# Patient Record
Sex: MALE | Race: WHITE | NOT HISPANIC OR LATINO | URBAN - METROPOLITAN AREA
[De-identification: names, ages, dates, MRNs, and addresses within clinical notes are randomized per-mention and may not be internally consistent; named-entity substitution may affect disease eponyms.]

---

## 2022-05-10 ENCOUNTER — EMERGENCY (EMERGENCY)
Facility: HOSPITAL | Age: 72
LOS: 1 days | Discharge: HOME | End: 2022-05-10
Attending: EMERGENCY MEDICINE | Admitting: EMERGENCY MEDICINE
Payer: COMMERCIAL

## 2022-05-10 VITALS
SYSTOLIC BLOOD PRESSURE: 130 MMHG | HEIGHT: 67 IN | RESPIRATION RATE: 19 BRPM | WEIGHT: 173.06 LBS | TEMPERATURE: 98 F | DIASTOLIC BLOOD PRESSURE: 72 MMHG | OXYGEN SATURATION: 99 % | HEART RATE: 90 BPM

## 2022-05-10 VITALS
DIASTOLIC BLOOD PRESSURE: 60 MMHG | RESPIRATION RATE: 16 BRPM | OXYGEN SATURATION: 100 % | SYSTOLIC BLOOD PRESSURE: 120 MMHG | HEART RATE: 88 BPM | TEMPERATURE: 97 F

## 2022-05-10 PROCEDURE — 90715 TDAP VACCINE 7 YRS/> IM: CPT

## 2022-05-10 PROCEDURE — 73110 X-RAY EXAM OF WRIST: CPT | Mod: 26,RT

## 2022-05-10 PROCEDURE — 90471 IMMUNIZATION ADMIN: CPT

## 2022-05-10 PROCEDURE — 73110 X-RAY EXAM OF WRIST: CPT

## 2022-05-10 PROCEDURE — 12011 RPR F/E/E/N/L/M 2.5 CM/<: CPT

## 2022-05-10 PROCEDURE — G1004: CPT

## 2022-05-10 PROCEDURE — 99283 EMERGENCY DEPT VISIT LOW MDM: CPT | Mod: 25

## 2022-05-10 PROCEDURE — 99284 EMERGENCY DEPT VISIT MOD MDM: CPT | Mod: 25

## 2022-05-10 PROCEDURE — 70450 CT HEAD/BRAIN W/O DYE: CPT | Mod: ME

## 2022-05-10 PROCEDURE — 70450 CT HEAD/BRAIN W/O DYE: CPT | Mod: 26,ME

## 2022-05-10 PROCEDURE — 72125 CT NECK SPINE W/O DYE: CPT | Mod: MA

## 2022-05-10 PROCEDURE — 72125 CT NECK SPINE W/O DYE: CPT | Mod: 26,MA

## 2022-05-10 RX ORDER — TETANUS TOXOID, REDUCED DIPHTHERIA TOXOID AND ACELLULAR PERTUSSIS VACCINE, ADSORBED 5; 2.5; 8; 8; 2.5 [IU]/.5ML; [IU]/.5ML; UG/.5ML; UG/.5ML; UG/.5ML
0.5 SUSPENSION INTRAMUSCULAR ONCE
Refills: 0 | Status: COMPLETED | OUTPATIENT
Start: 2022-05-10 | End: 2022-05-10

## 2022-05-10 RX ADMIN — TETANUS TOXOID, REDUCED DIPHTHERIA TOXOID AND ACELLULAR PERTUSSIS VACCINE, ADSORBED 0.5 MILLILITER(S): 5; 2.5; 8; 8; 2.5 SUSPENSION INTRAMUSCULAR at 18:58

## 2022-05-10 NOTE — ED PROVIDER NOTE - NS ED ATTENDING STATEMENT MOD
This was a shared visit with the DWIGHT. I reviewed and verified the documentation and independently performed the documented:

## 2022-05-10 NOTE — ED ADULT NURSE NOTE - OBJECTIVE STATEMENT
Patient is a 73yo male fell at work hitting right side of head patient has laceration over right eye . Patient denies pain nausea vomiting diarrhea Patient denies syncope or loss of consciousness. Patient is on daily aspirin 324mg with history of cardiac bypass.

## 2022-05-10 NOTE — ED PROVIDER NOTE - PROGRESS NOTE DETAILS
pt offered plastics, refused. CTs neg. lac repaired with no complication. dc'ed on abx. Discussed the results of all diagnostic testing in ED and copies of all reports given.   Pt was given an opportunity to have all questions answered to satisfaction.  Discussed the importance of prompt, close medical follow-up. ED return precautions discussed at length.  Pt verbalizes agreement and understanding of plan and ED return precautions. Pt well appearing, stable for DC home. No emergent concerns at this time. pt placed in wrist splint

## 2022-05-10 NOTE — ED PROVIDER NOTE - OBJECTIVE STATEMENT
73 yo M PMHx CAD HTN HLD presnts to ED c/o R facial laceration sp mechanical trip and fall this AM. pt denies LOC, states he was able to get up assisted, went to work without difficulty, here for evaluation and repair. pt denies HA, dizziness, neck pain, chest pain, visual changes, N/V, numbness/tingling. +Daily asa use. unknwn last tetanus

## 2022-05-10 NOTE — ED ADULT NURSE NOTE - NSIMPLEMENTINTERV_GEN_ALL_ED
Implemented All Fall Risk Interventions:  [place] to call system. Call bell, personal items and telephone within reach. Instruct patient to call for assistance. Room bathroom lighting operational. Non-slip footwear when patient is off stretcher. Physically safe environment: no spills, clutter or unnecessary equipment. Stretcher in lowest position, wheels locked, appropriate side rails in place. Provide visual cue, wrist band, yellow gown, etc. Monitor gait and stability. Monitor for mental status changes and reorient to person, place, and time. Review medications for side effects contributing to fall risk. Reinforce activity limits and safety measures with patient and family.

## 2022-05-10 NOTE — ED PROVIDER NOTE - ENMT, MLM
+2cm deep lac R forehead, Airway patent, Nasal mucosa clear. Mouth with normal mucosa. Throat has no vesicles, no oropharyngeal exudates and uvula is midline.no hemotympanum

## 2022-05-10 NOTE — ED PROVIDER NOTE - PATIENT PORTAL LINK FT
You can access the FollowMyHealth Patient Portal offered by [facility] by registering at the following website: [email or web address]. By joining [employer]’s FollowMyHealth portal, you will also be able to view your health information using other applications (apps) compatible with our system.

## 2022-05-10 NOTE — ED PROVIDER NOTE - CLINICAL SUMMARY MEDICAL DECISION MAKING FREE TEXT BOX
Pt is a 71 yo mail presnts to ED c/o R facial laceration sp mechanical trip and fall this AM. pt denies LOC, states he was able to get up assisted, went to work without difficulty, here for evaluation and repair. pt denies HA, dizziness, neck pain, chest pain, visual changes, N/V, numbness/tingling. +Daily asa use. unknwn last tetanus.  pt on exam with 2cm deep right forehead laceration just above eyebrow, c spine nt, right wrist minimal pisiform ttp, exam otherwise neg, xrays neg, sutures. dc on augmentin, td, wound care.  d/c sutures one week

## 2022-05-10 NOTE — ED PROVIDER NOTE - NSFOLLOWUPINSTRUCTIONS_ED_ALL_ED_FT
Keep wound clean and dry  Wash with soap and water and keep dry. Apply bacitracin and keep covered.  Take Augmentin twice daily for 10 days  F/u with your PCP for wound check within 2 days  Return to ED immediately for new or worsening symptoms    Sutured Wound Care      Sutures are stitches that can be used to close wounds. Taking care of your wound properly can help to prevent pain and infection. It can also help your wound to heal more quickly. Follow instructions from your health care provider about how to care for your sutured wound.      Supplies needed:    •Soap and water.      •A clean bandage (dressing), if needed.      •Antibiotic ointment.      •A clean towel.        How to care for your sutured wound     •Keep the wound completely dry for the first 24 hours, or for as long as directed by your health care provider. After 24–48 hours, you may shower or bathe as directed by your health care provider. Do not soak or submerge the wound in water until the sutures have been removed.    •After the first 24 hours, clean the wound once a day, or as often as directed by your health care provider, using the following steps:  •Wash the wound with soap and water.      •Rinse the wound with water to remove all soap.      •Pat the wound dry with a clean towel. Do not rub the wound.        •After cleaning the wound, apply a thin layer of antibiotic ointment as directed by your health care provider. This will prevent infection and keep the dressing from sticking to the wound.    •Follow instructions from your health care provider about how to change your dressing:  •Wash your hands with soap and water. If soap and water are not available, use hand .      •Change your dressing at least once a day, or as often as told by your health care provider. If your dressing gets wet or dirty, change it.      •Leave sutures and other skin closures, such as adhesive tape or skin glue, in place. These skin closures may need to stay in place for 2 weeks or longer. If adhesive strip edges start to loosen and curl up, you may trim the loose edges. Do not remove adhesive strips completely unless your health care provider tells you to do that.      •Check your wound every day for signs of infection. Watch for:  •Redness, swelling, or pain.      •Fluid or blood.      •Warmth.      •Pus or a bad smell.        •Have the sutures removed as directed by your health care provider.        Follow these instructions at home:    Medicines     •Take or apply over-the-counter and prescription medicines only as told by your health care provider.      •If you were prescribed an antibiotic medicine or ointment, take or apply it as told by your health care provider. Do not stop using the antibiotic even if your condition improves.      General instructions     •To help reduce scarring after your wound heals, cover your wound with clothing or apply sunscreen of at least 30 SPF whenever you are outside.      • Do not scratch or pick at your wound.      •Avoid stretching your wound.      •Raise (elevate) the injured area above the level of your heart while you are sitting or lying down, if possible.      •Drink enough fluids to keep your urine clear or pale yellow.      •Keep all follow-up visits as told by your health care provider. This is important.        Contact a health care provider if:    •You received a tetanus shot and you have swelling, severe pain, redness, or bleeding at the injection site.      •Your wound breaks open.      •You have redness, swelling, or pain around your wound.      •You have fluid or blood coming from your wound.      •Your wound feels warm to the touch.      •You have a fever.      •You notice something coming out of your wound, such as wood or glass.      •You have pain that does not get better with medicine.      •The skin near your wound changes color.      •You need to change your dressing very frequently due to a lot of fluid, blood, or pus draining from the wound.      •You develop a new rash.      •You develop numbness around the wound.        Get help right away if:    •You develop severe swelling around your wound.      •You have pus or a bad smell coming from your wound.      •Your pain suddenly gets worse and is severe.      •You develop painful lumps near your wound or anywhere on your body.      •You have a red streak going away from your wound.    •The wound is on your hand or foot and:  •You cannot properly move a finger or toe.      •Your fingers or toes look pale or bluish.      •You have numbness that is spreading down your hand, foot, fingers, or toes.          Summary    •Sutures are stitches that can be used to close wounds.      •Taking care of your wound properly can help to prevent pain and infection.      •Keep the wound completely dry for the first 24 hours, or for as long as directed by your health care provider. After 24–48 hours, you may shower or bathe as directed by your health care provider.      This information is not intended to replace advice given to you by your health care provider. Make sure you discuss any questions you have with your health care provider.      Document Revised: 10/14/2021 Document Reviewed: 10/15/2021    [employer] Patient Education © 2022 [employer]                  Log Out.      [employer]® CareNotes®     :  [facility]  	                     HEAD INJURY - AfterCare(R) Instructions(ER/ED)           Head Injury    WHAT YOU NEED TO KNOW:    A head injury can include your scalp, face, skull, or brain and range from mild to severe. Effects can appear immediately after the injury or develop later. The effects may last a short time or be permanent. Healthcare providers may want to check your recovery over time. Treatment may change as you recover or develop new health problems from the head injury.    DISCHARGE INSTRUCTIONS:    Call your local emergency number (911 in the ), or have someone else call if:   •You cannot be woken.      •You have a seizure.      •You stop responding to others or you faint.      •You have blurry or double vision.      •Your speech becomes slurred or confused.      •You have arm or leg weakness, loss of feeling, or new problems with coordination.      •Your pupils are larger than usual, or one pupil is a different size than the other.      •You have blood or clear fluid coming out of your ears or nose.      Return to the emergency department if:   •You have repeated or forceful vomiting.      •You feel confused.      •Your headache gets worse or becomes severe.      •You or someone caring for you notices that you are harder to wake than usual.      Call your doctor if:   •Your symptoms last longer than 6 weeks after the injury.      •You have questions or concerns about your condition or care.      Medicines:   •Acetaminophen decreases pain and fever. It is available without a doctor's order. Ask how much to take and how often to take it. Follow directions. Read the labels of all other medicines you are using to see if they also contain acetaminophen, or ask your doctor or pharmacist. Acetaminophen can cause liver damage if not taken correctly.       •Take your medicine as directed. Contact your healthcare provider if you think your medicine is not helping or if you have side effects. Tell him or her if you are allergic to any medicine. Keep a list of the medicines, vitamins, and herbs you take. Include the amounts, and when and why you take them. Bring the list or the pill bottles to follow-up visits. Carry your medicine list with you in case of an emergency.      Self-care:   •Rest or do quiet activities. Limit your time watching TV, using the computer, or doing tasks that require a lot of thinking. Slowly return to your normal activities as directed. Do not play sports or do activities that may cause you to get hit in the head. Ask your healthcare provider when you can return to sports.      •Apply ice on your head for 15 to 20 minutes every hour or as directed. Use an ice pack, or put crushed ice in a plastic bag. Cover it with a towel before you apply it to your skin. Ice helps prevent tissue damage and decreases swelling and pain.      •Have someone stay with you for 24 hours , or as directed. This person can monitor you for problems and call for help if needed. When you are awake, the person should ask you a few questions every few hours to see if you are thinking clearly. An example is to ask your name or address.      Prevent another head injury:   •Wear a helmet that fits properly. Do this when you play sports, or ride a bike, scooter, or skateboard. Helmets help decrease your risk for a serious head injury. Talk to your healthcare provider about other ways you can protect yourself if you play sports.      •Wear your seatbelt every time you are in a car. This helps lower your risk for a head injury if you are in a car accident.      Follow up with your doctor as directed: Write down your questions so you remember to ask them during your visits.       © Copyright [employer] 2022           back to top                          © Copyright [employer] 2022

## 2022-05-20 ENCOUNTER — EMERGENCY (EMERGENCY)
Facility: HOSPITAL | Age: 72
LOS: 1 days | Discharge: HOME | End: 2022-05-20
Attending: EMERGENCY MEDICINE | Admitting: EMERGENCY MEDICINE
Payer: COMMERCIAL

## 2022-05-20 VITALS
SYSTOLIC BLOOD PRESSURE: 115 MMHG | TEMPERATURE: 98 F | HEART RATE: 65 BPM | HEIGHT: 67 IN | WEIGHT: 167.99 LBS | DIASTOLIC BLOOD PRESSURE: 66 MMHG | OXYGEN SATURATION: 98 % | RESPIRATION RATE: 16 BRPM

## 2022-05-20 PROBLEM — I10 ESSENTIAL (PRIMARY) HYPERTENSION: Chronic | Status: ACTIVE | Noted: 2022-05-10

## 2022-05-20 PROBLEM — I25.10 ATHEROSCLEROTIC HEART DISEASE OF NATIVE CORONARY ARTERY WITHOUT ANGINA PECTORIS: Chronic | Status: ACTIVE | Noted: 2022-05-10

## 2022-05-20 PROCEDURE — L9995: CPT

## 2022-05-20 PROCEDURE — G0463: CPT

## 2022-05-20 NOTE — ED PROVIDER NOTE - PATIENT PORTAL LINK FT
You can access the FollowMyHealth Patient Portal offered by [facility] by registering at the following website: [email or web address]. By joining [facility]’s FollowMyHealth portal, you will also be able to view your health information using other applications (apps) compatible with our system.

## 2022-05-20 NOTE — ED PROVIDER NOTE - NSFOLLOWUPINSTRUCTIONS_ED_ALL_ED_FT
Follow up with pcp  return to er for any worsening symptoms     Suture Removal, Care After      This sheet gives you information about how to care for yourself after your procedure. Your health care provider may also give you more specific instructions. If you have problems or questions, contact your health care provider.      What can I expect after the procedure?    After your stitches (sutures) are removed, it is common to have:  •Some discomfort and swelling in the area.      •Slight redness in the area.        Follow these instructions at home:    If you have a bandage:     •Wash your hands with soap and water before you change your bandage (dressing). If soap and water are not available, use hand .      •Change your dressing as told by your health care provider. If your dressing becomes wet or dirty, or develops a bad smell, change it as soon as possible.      •If your dressing sticks to your skin, soak it in warm water to loosen it.        Wound care    •Check your wound every day for signs of infection. Check for:  •More redness, swelling, or pain.      •Fluid or blood.      •Warmth.      • Pus or a bad smell.        •Wash your hands with soap and water before and after touching your wound.      •Apply cream or ointment only as directed by your health care provider. If you are using cream or ointment, wash the area with soap and water 2 times a day to remove all the cream or ointment. Rinse off the soap and pat the area dry with a clean towel.      •If you have skin glue or adhesive strips on your wound, leave these closures in place. They may need to stay in place for 2 weeks or longer. If adhesive strip edges start to loosen and curl up, you may trim the loose edges. Do not remove adhesive strips completely unless your health care provider tells you to do that.      •Keep the wound area dry and clean. Do not take baths, swim, or use a hot tub until your health care provider approves.      •Continue to protect the wound from injury.      • Do not pick at your wound. Picking can cause an infection.      •When your wound has completely healed, wear sunscreen over it or cover it with clothing when you are outside. New scars get sunburned easily, which can make scarring worse.      General instructions     •Take over-the-counter and prescription medicines only as told by your health care provider.      •Keep all follow-up visits as told by your health care provider. This is important.        Contact a health care provider if:    •You have redness, swelling, or pain around your wound.      •You have fluid or blood coming from your wound.      •Your wound feels warm to the touch.      •You have pus or a bad smell coming from your wound.      •Your wound opens up.        Get help right away if:    •You have a fever.      •You have redness that is spreading from your wound.        Summary    •After your sutures are removed, it is common to have some discomfort and swelling in the area.      •Wash your hands with soap and water before you change your bandage (dressing).      •Keep the wound area dry and clean. Do not take baths, swim, or use a hot tub until your health care provider approves.      This information is not intended to replace advice given to you by your health care provider. Make sure you discuss any questions you have with your health care provider.      Document Revised: 10/14/2021 Document Reviewed: 10/15/2021    [employer] Patient Education © 2022 [employer] Follow up with pcp and plastics   return to er for any worsening symptoms     Suture Removal, Care After      This sheet gives you information about how to care for yourself after your procedure. Your health care provider may also give you more specific instructions. If you have problems or questions, contact your health care provider.      What can I expect after the procedure?    After your stitches (sutures) are removed, it is common to have:  •Some discomfort and swelling in the area.      •Slight redness in the area.        Follow these instructions at home:    If you have a bandage:     •Wash your hands with soap and water before you change your bandage (dressing). If soap and water are not available, use hand .      •Change your dressing as told by your health care provider. If your dressing becomes wet or dirty, or develops a bad smell, change it as soon as possible.      •If your dressing sticks to your skin, soak it in warm water to loosen it.        Wound care    •Check your wound every day for signs of infection. Check for:  •More redness, swelling, or pain.      •Fluid or blood.      •Warmth.      • Pus or a bad smell.        •Wash your hands with soap and water before and after touching your wound.      •Apply cream or ointment only as directed by your health care provider. If you are using cream or ointment, wash the area with soap and water 2 times a day to remove all the cream or ointment. Rinse off the soap and pat the area dry with a clean towel.      •If you have skin glue or adhesive strips on your wound, leave these closures in place. They may need to stay in place for 2 weeks or longer. If adhesive strip edges start to loosen and curl up, you may trim the loose edges. Do not remove adhesive strips completely unless your health care provider tells you to do that.      •Keep the wound area dry and clean. Do not take baths, swim, or use a hot tub until your health care provider approves.      •Continue to protect the wound from injury.      • Do not pick at your wound. Picking can cause an infection.      •When your wound has completely healed, wear sunscreen over it or cover it with clothing when you are outside. New scars get sunburned easily, which can make scarring worse.      General instructions     •Take over-the-counter and prescription medicines only as told by your health care provider.      •Keep all follow-up visits as told by your health care provider. This is important.        Contact a health care provider if:    •You have redness, swelling, or pain around your wound.      •You have fluid or blood coming from your wound.      •Your wound feels warm to the touch.      •You have pus or a bad smell coming from your wound.      •Your wound opens up.        Get help right away if:    •You have a fever.      •You have redness that is spreading from your wound.        Summary    •After your sutures are removed, it is common to have some discomfort and swelling in the area.      •Wash your hands with soap and water before you change your bandage (dressing).      •Keep the wound area dry and clean. Do not take baths, swim, or use a hot tub until your health care provider approves.      This information is not intended to replace advice given to you by your health care provider. Make sure you discuss any questions you have with your health care provider.      Document Revised: 10/14/2021 Document Reviewed: 10/15/2021    [employer] Patient Education © 2022 [employer] Inc.

## 2022-05-20 NOTE — ED PROVIDER NOTE - OBJECTIVE STATEMENT
Pt is a 71 yo male with pmhx of  CAD HTN HLD presnts to ED c/o laceration removal R facial laceration sp mechanical trip and fall 9 days ago. pt denies any complaints no fever no drainage Pt is a 71 yo male with pmhx of  CAD HTN HLD presnts to ED c/o laceration removal R facial laceration sp mechanical trip and fall 9 days ago. pt denies any complaints no fever no drainage. pt co of mild numbness to right scalp area no blurry vision

## 2022-05-20 NOTE — ED PROVIDER NOTE - CARE PROVIDER_API CALL
Hany Cruz (DO)  Surgery  [address], [address]  [place] [address]  Phone: [phone]  Fax: [phone]  Follow Up Time:

## 2022-05-20 NOTE — ED PROVIDER NOTE - CONSTITUTIONAL, MLM
normal... Well appearing, awake, alert, oriented to person, place, time/situation and in no apparent distress. right forehead cdi suture in place Well appearing, awake, alert, oriented to person, place, time/situation and in no apparent distress. right forehead steri strip in place wound mildly open no redness no discharge right eyebrow droop noted

## 2022-05-20 NOTE — ED PROVIDER NOTE - NS ED ATTENDING STATEMENT MOD
[New Patient/Consultation] : a new patient/consultation for [Thrombocytopenia] : thrombocytopenia [Parents] : parents [Medical Records] : medical records This was a shared visit with the DWIGHT. I reviewed and verified the documentation and independently performed the documented:

## 2022-12-06 ENCOUNTER — EMERGENCY (EMERGENCY)
Facility: HOSPITAL | Age: 72
LOS: 1 days | Discharge: HOME | End: 2022-12-06
Attending: EMERGENCY MEDICINE | Admitting: EMERGENCY MEDICINE
Payer: COMMERCIAL

## 2022-12-06 VITALS
DIASTOLIC BLOOD PRESSURE: 95 MMHG | HEART RATE: 52 BPM | SYSTOLIC BLOOD PRESSURE: 175 MMHG | WEIGHT: 175.05 LBS | RESPIRATION RATE: 18 BRPM | TEMPERATURE: 97 F | OXYGEN SATURATION: 100 %

## 2022-12-06 VITALS
SYSTOLIC BLOOD PRESSURE: 150 MMHG | RESPIRATION RATE: 18 BRPM | OXYGEN SATURATION: 99 % | DIASTOLIC BLOOD PRESSURE: 60 MMHG | TEMPERATURE: 98 F | HEART RATE: 54 BPM

## 2022-12-06 PROBLEM — Z00.00 ENCOUNTER FOR PREVENTIVE HEALTH EXAMINATION: Status: ACTIVE | Noted: 2022-12-06

## 2022-12-06 PROCEDURE — 73080 X-RAY EXAM OF ELBOW: CPT

## 2022-12-06 PROCEDURE — 99283 EMERGENCY DEPT VISIT LOW MDM: CPT | Mod: 25

## 2022-12-06 PROCEDURE — 99283 EMERGENCY DEPT VISIT LOW MDM: CPT

## 2022-12-06 PROCEDURE — 73080 X-RAY EXAM OF ELBOW: CPT | Mod: 26,RT
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
## 2022-12-06 NOTE — ED PROVIDER NOTE - NSFOLLOWUPINSTRUCTIONS_ED_ALL_ED_FT
[employer]® CareNotes®     :  [facility]  	        ELBOW SPRAIN - AfterCare(R) Instructions(ER/ED)       Elbow Sprain    WHAT YOU NEED TO KNOW:    An elbow sprain is caused by a stretched or torn ligament in the elbow joint. Ligaments are the strong tissues that connect bones.    DISCHARGE INSTRUCTIONS:    Return to the emergency department if:   •The skin of your injured arm looks bluish or pale (less color than normal).      •You have new or increased numbness in your injured arm.      Call your doctor if:   •You have increased swelling and pain in your elbow.      •You have new or increased stiffness or trouble moving your injured arm.      •You have questions or concerns about your condition or care.      Medicines:   •Prescription pain medicine may be given. Ask your healthcare provider how to take this medicine safely. Some prescription pain medicines contain acetaminophen. Do not take other medicines that contain acetaminophen without talking to your healthcare provider. Too much acetaminophen may cause liver damage. Prescription pain medicine may cause constipation. Ask your healthcare provider how to prevent or treat constipation.       •Take your medicine as directed. Contact your healthcare provider if you think your medicine is not helping or if you have side effects. Tell your provider if you are allergic to any medicine. Keep a list of the medicines, vitamins, and herbs you take. Include the amounts, and when and why you take them. Bring the list or the pill bottles to follow-up visits. Carry your medicine list with you in case of an emergency.      Rest your elbow: You will need to rest your elbow for 1 to 2 days after your injury. This will help decrease the risk of more damage to your elbow.    Ice your elbow: Apply ice on your elbow for 15 to 20 minutes every hour or as directed. Use an ice pack, or put crushed ice in a plastic bag. Cover it with a towel. Ice helps prevent tissue damage and decreases swelling and pain.    Compress your elbow: Compression provides support and helps decrease swelling and movement so your elbow can heal. You may be told to keep your elbow wrapped with a tight elastic bandage. Follow instructions about how to apply your bandage.    Elevate your elbow: Elevate your elbow above the level of your heart as often as you can. This will help decrease swelling and pain. Prop your elbow on pillows or blankets to keep it elevated comfortably.     Exercise your elbow: You should begin to exercise your arm in a few days, once you are able to move your elbow without pain. Exercises will help decrease stiffness and improve the strength of your arm. Ask your healthcare provider what kind of exercises you should do.    Prevent another elbow sprain:   •Make sure you warm up and stretch before you exercise.      •Do not exercise when you feel pain or you are tired.      •Wear equipment to protect yourself when you play sports.      •Stop exercising and playing sports if your symptoms from a past injury return.      Follow up with your doctor within 1 week: Write down any questions you have so you remember to ask them in your follow-up visits.       © Copyright [employer] 2022         Keep sling on.  Cold Packs 10 minutes on/ 10 minutes off  Advil 2-3 tablets (400-600mg) every 8 hours with food as needed.  Follow up with orthopedist this week.  Please return to the Emergency Department immediately for any problems or concerns.

## 2022-12-06 NOTE — ED ADULT NURSE NOTE - NSIMPLEMENTINTERV_GEN_ALL_ED
Implemented All Universal Safety Interventions:  [place] to call system. Call bell, personal items and telephone within reach. Instruct patient to call for assistance. Room bathroom lighting operational. Non-slip footwear when patient is off stretcher. Physically safe environment: no spills, clutter or unnecessary equipment. Stretcher in lowest position, wheels locked, appropriate side rails in place.

## 2022-12-06 NOTE — ED PROVIDER NOTE - PHYSICAL EXAMINATION
Gen: Awake, Alert, WD, WN, NAD  Head:  NC/AT  Neck: supple, nontender, trachea midline  Ext:  warm, well perfused, moving all extremities spontaneously, right shoulde/arm/forearm/wrist/hand/elbow non tender, no edema, FROM, neg supraspinatus stress test, neg supination stress test. no elbow deformity or point tenderness.  Skin: intact, no rash, no vesicles, no petechiae, no ecchymosis  Neuro:  AAOx3, sensation intact, motor 5/5 x 4 extremities, normal gait, speech clear

## 2022-12-06 NOTE — ED ADULT TRIAGE NOTE - CCCP TRG CHIEF CMPLNT
pain, arm O-T Plasty Text: The defect edges were debeveled with a #15 scalpel blade.  Given the location of the defect, shape of the defect and the proximity to free margins an O-T plasty was deemed most appropriate.  Using a sterile surgical marker, an appropriate O-T plasty was drawn incorporating the defect and placing the expected incisions within the relaxed skin tension lines where possible.    The area thus outlined was incised deep to adipose tissue with a #15 scalpel blade.  The skin margins were undermined to an appropriate distance in all directions utilizing iris scissors.

## 2022-12-06 NOTE — ED PROVIDER NOTE - OBJECTIVE STATEMENT
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 pt denies swelling, redness, numbness, tingling, weakness, shoulder pain, neck pain, wrist or hand pain, forearm pain.  RHD  no orthopedist

## 2022-12-06 NOTE — ED ADULT NURSE NOTE - OBJECTIVE STATEMENT
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 Patient states that yesterday he had to physically move the arm on to his chest why he slept. Patient had no ROM issue today but feel pain in arm

## 2022-12-06 NOTE — ED PROVIDER NOTE - PATIENT PORTAL LINK FT
You can access the FollowMyHealth Patient Portal offered by [facility] by registering at the following website: [email or web address]. By joining [facility]’s FollowMyHealth portal, you will also be able to view your health information using other applications (apps) compatible with our system.

## 2022-12-06 NOTE — ED PROVIDER NOTE - CARE PROVIDER_API CALL
John Zavala)  Sanjay Ortiz  Physicians  [address] [place] [address]  Phone: [phone]  Fax: [phone]  Follow Up Time: 4-6 Days  
36.1

## 2022-12-08 ENCOUNTER — APPOINTMENT (OUTPATIENT)
Dept: ORTHOPEDIC SURGERY | Facility: CLINIC | Age: 72
End: 2022-12-08

## 2022-12-08 VITALS — HEIGHT: 67 IN | BODY MASS INDEX: 27.47 KG/M2 | WEIGHT: 175 LBS

## 2022-12-08 DIAGNOSIS — I10 ESSENTIAL (PRIMARY) HYPERTENSION: ICD-10-CM

## 2022-12-08 DIAGNOSIS — E78.00 PURE HYPERCHOLESTEROLEMIA, UNSPECIFIED: ICD-10-CM

## 2022-12-08 DIAGNOSIS — S46.211A STRAIN OF MUSCLE, FASCIA AND TENDON OF OTHER PARTS OF BICEPS, RIGHT ARM, INITIAL ENCOUNTER: ICD-10-CM

## 2022-12-08 DIAGNOSIS — Z78.9 OTHER SPECIFIED HEALTH STATUS: ICD-10-CM

## 2022-12-08 DIAGNOSIS — E11.9 TYPE 2 DIABETES MELLITUS W/OUT COMPLICATIONS: ICD-10-CM

## 2022-12-08 PROCEDURE — 99072 ADDL SUPL MATRL&STAF TM PHE: CPT

## 2022-12-08 PROCEDURE — 99203 OFFICE O/P NEW LOW 30 MIN: CPT

## 2022-12-08 RX ORDER — ALFUZOSIN HYDROCHLORIDE 10 MG/1
TABLET, EXTENDED RELEASE ORAL
Refills: 0 | Status: ACTIVE | COMMUNITY

## 2022-12-08 NOTE — IMAGING
[de-identified] : No swelling, no ecchymosis, no warmth, no fluctuance.\par No tenderness to palpation over elbow\par Full elbow flexion, extension, supination, pronation\par 5/5 strength in flexion, extension, supination, pronation\par No Varus or Valgus instability\par Motor and sensory intact distally\par

## 2022-12-08 NOTE — HISTORY OF PRESENT ILLNESS
[6] : 6 [5] : 5 [Dull/Aching] : dull/aching [Localized] : localized [Full time] : Work status: full time [] : This patient has had an injection before: no [FreeTextEntry1] : R Elbow [FreeTextEntry3] : 12/5/22 [FreeTextEntry5] : 71 Y/O RHD M Eval R elbow S/P Pressure injury at work on above DOI prior TX of XR on 12/7/22 by [facility]  [de-identified] : 12/7/22 [de-identified] : [facility] [de-identified] : XR  [de-identified] : XR  [de-identified] : Rick

## 2022-12-08 NOTE — ASSESSMENT
[FreeTextEntry1] : RIGHT BICEP STRAIN AT WORK\par NORMAL XRAYS\par SYMPTOMS RESOLVED; EXAM NORMAL TODAY\par F/U PRN

## 2023-05-01 ENCOUNTER — OFFICE (OUTPATIENT)
Dept: URBAN - METROPOLITAN AREA CLINIC 109 | Facility: CLINIC | Age: 73
Setting detail: OPHTHALMOLOGY
End: 2023-05-01
Payer: COMMERCIAL

## 2023-05-01 VITALS — HEIGHT: 60 IN

## 2023-05-01 DIAGNOSIS — E11.3293: ICD-10-CM

## 2023-05-01 DIAGNOSIS — H25.13: ICD-10-CM

## 2023-05-01 PROCEDURE — 99213 OFFICE O/P EST LOW 20 MIN: CPT | Performed by: OPHTHALMOLOGY

## 2023-05-01 PROCEDURE — 92201 OPSCPY EXTND RTA DRAW UNI/BI: CPT | Performed by: OPHTHALMOLOGY

## 2023-05-01 ASSESSMENT — REFRACTION_AUTOREFRACTION
OD_SPHERE: +1.00
OS_CYLINDER: -1.00
OS_AXIS: 095
OD_AXIS: 130
OS_SPHERE: +0.75
OD_CYLINDER: -0.50

## 2023-05-01 ASSESSMENT — KERATOMETRY
OD_K2POWER_DIOPTERS: 44.87
OD_AXISANGLE_DEGREES: 113
OD_K1POWER_DIOPTERS: 44.37
OS_AXISANGLE_DEGREES: 87
OS_K2POWER_DIOPTERS: 44.75
OS_K1POWER_DIOPTERS: 44.25

## 2023-05-01 ASSESSMENT — AXIALLENGTH_DERIVED
OD_AL: 22.9087
OS_AL: 23.1362

## 2023-05-01 ASSESSMENT — SPHEQUIV_DERIVED
OS_SPHEQUIV: 0.25
OD_SPHEQUIV: 0.75

## 2023-05-01 ASSESSMENT — REFRACTION_CURRENTRX
OS_OVR_VA: 20/
OD_OVR_VA: 20/
OS_SPHERE: +2.00
OD_SPHERE: +2.00

## 2023-05-01 ASSESSMENT — VISUAL ACUITY
OD_BCVA: 20/25-1
OS_BCVA: 20/40-2

## 2023-05-01 ASSESSMENT — CONFRONTATIONAL VISUAL FIELD TEST (CVF)
OD_FINDINGS: FULL
OS_FINDINGS: FULL

## 2023-05-01 ASSESSMENT — REFRACTION_MANIFEST
OD_SPHERE: +0.75
OS_SPHERE: +0.50

## 2024-05-06 ENCOUNTER — OFFICE (OUTPATIENT)
Dept: URBAN - METROPOLITAN AREA CLINIC 109 | Facility: CLINIC | Age: 74
Setting detail: OPHTHALMOLOGY
End: 2024-05-06
Payer: COMMERCIAL

## 2024-05-06 DIAGNOSIS — E11.3293: ICD-10-CM

## 2024-05-06 DIAGNOSIS — H25.13: ICD-10-CM

## 2024-05-06 DIAGNOSIS — H43.393: ICD-10-CM

## 2024-05-06 PROCEDURE — 92250 FUNDUS PHOTOGRAPHY W/I&R: CPT | Performed by: OPHTHALMOLOGY

## 2024-05-06 PROCEDURE — 92014 COMPRE OPH EXAM EST PT 1/>: CPT | Performed by: OPHTHALMOLOGY

## 2024-05-06 ASSESSMENT — CONFRONTATIONAL VISUAL FIELD TEST (CVF)
OD_FINDINGS: FULL
OS_FINDINGS: FULL

## 2025-05-07 ENCOUNTER — OFFICE (OUTPATIENT)
Dept: URBAN - METROPOLITAN AREA CLINIC 109 | Facility: CLINIC | Age: 75
Setting detail: OPHTHALMOLOGY
End: 2025-05-07
Payer: COMMERCIAL

## 2025-05-07 VITALS — HEIGHT: 60 IN

## 2025-05-07 DIAGNOSIS — H43.393: ICD-10-CM

## 2025-05-07 DIAGNOSIS — H25.11: ICD-10-CM

## 2025-05-07 DIAGNOSIS — E11.3293: ICD-10-CM

## 2025-05-07 PROCEDURE — 99214 OFFICE O/P EST MOD 30 MIN: CPT | Performed by: OPHTHALMOLOGY

## 2025-05-07 ASSESSMENT — REFRACTION_AUTOREFRACTION
OD_SPHERE: -2.50
OS_AXIS: 110
OS_SPHERE: +0.50
OD_AXIS: 50
OS_CYLINDER: -0.75
OD_CYLINDER: -0.50

## 2025-05-07 ASSESSMENT — REFRACTION_CURRENTRX
OS_OVR_VA: 20/
OD_OVR_VA: 20/
OD_SPHERE: +2.00
OS_SPHERE: +2.00

## 2025-05-07 ASSESSMENT — KERATOMETRY
OD_K1POWER_DIOPTERS: 44.50
OS_AXISANGLE_DEGREES: 178
OD_AXISANGLE_DEGREES: 112
OS_K2POWER_DIOPTERS: 45.25
OD_K2POWER_DIOPTERS: 45.00
OS_K1POWER_DIOPTERS: 44.75

## 2025-05-07 ASSESSMENT — REFRACTION_MANIFEST
OD_VA1: 20/NI
OD_SPHERE: +0.75
OS_SPHERE: +0.50

## 2025-05-07 ASSESSMENT — VISUAL ACUITY
OD_BCVA: 20/20
OS_BCVA: 20/60-2

## 2025-05-07 ASSESSMENT — CONFRONTATIONAL VISUAL FIELD TEST (CVF)
OS_FINDINGS: FULL
OD_FINDINGS: FULL

## 2025-06-02 ENCOUNTER — OFFICE (OUTPATIENT)
Dept: URBAN - METROPOLITAN AREA CLINIC 109 | Facility: CLINIC | Age: 75
Setting detail: OPHTHALMOLOGY
End: 2025-06-02
Payer: COMMERCIAL

## 2025-06-02 DIAGNOSIS — H25.13: ICD-10-CM

## 2025-06-02 DIAGNOSIS — H25.11: ICD-10-CM

## 2025-06-02 DIAGNOSIS — E11.3293: ICD-10-CM

## 2025-06-02 PROCEDURE — 92134 CPTRZ OPH DX IMG PST SGM RTA: CPT | Performed by: OPHTHALMOLOGY

## 2025-06-02 PROCEDURE — 99213 OFFICE O/P EST LOW 20 MIN: CPT | Performed by: OPHTHALMOLOGY

## 2025-06-02 PROCEDURE — 92136 OPHTHALMIC BIOMETRY: CPT | Performed by: OPHTHALMOLOGY

## 2025-06-02 ASSESSMENT — REFRACTION_AUTOREFRACTION
OD_CYLINDER: -0.50
OD_SPHERE: -2.75
OD_AXIS: 031
OS_SPHERE: +0.50
OS_AXIS: 110
OS_CYLINDER: -0.75

## 2025-06-02 ASSESSMENT — KERATOMETRY
OS_CYLAXISANGLE_DEGREES: 155
OS_K1K2_AVERAGE: 44.875
OD_AXISANGLE2_DEGREES: 71
OS_CYLPOWER_DEGREES: 0.25
OS_AXISANGLE_DEGREES: 155
OD_K1POWER_DIOPTERS: 44.50
OS_K2POWER_DIOPTERS: 45.00
OD_K2POWER_DIOPTERS: 45.50
OS_K1POWER_DIOPTERS: 44.75
OD_CYLPOWER_DEGREES: 1
OD_AXISANGLE_DEGREES: 71
OS_K1POWER_DIOPTERS: 44.75
OD_K2POWER_DIOPTERS: 45.50
OS_AXISANGLE_DEGREES: 155
OD_K1POWER_DIOPTERS: 44.50
OD_CYLAXISANGLE_DEGREES: 71
OD_AXISANGLE_DEGREES: 71
OD_K1K2_AVERAGE: 45
OS_K2POWER_DIOPTERS: 45.00
OS_AXISANGLE2_DEGREES: 155

## 2025-06-02 ASSESSMENT — REFRACTION_CURRENTRX
OD_SPHERE: +2.00
OS_OVR_VA: 20/
OD_OVR_VA: 20/
OS_SPHERE: +2.00

## 2025-06-02 ASSESSMENT — VISUAL ACUITY
OS_BCVA: 20/60-2
OD_BCVA: 20/20

## 2025-06-02 ASSESSMENT — CONFRONTATIONAL VISUAL FIELD TEST (CVF)
OD_FINDINGS: FULL
OS_FINDINGS: FULL

## 2025-06-02 ASSESSMENT — REFRACTION_MANIFEST
OD_SPHERE: +0.75
OS_SPHERE: +0.50
OD_VA1: 20/NI

## 2025-06-24 ENCOUNTER — AMBULATORY SURGERY CENTER (OUTPATIENT)
Dept: URBAN - METROPOLITAN AREA SURGERY 19 | Facility: SURGERY | Age: 75
Setting detail: OPHTHALMOLOGY
End: 2025-06-24
Payer: COMMERCIAL

## 2025-06-24 DIAGNOSIS — H25.11: ICD-10-CM

## 2025-06-24 DIAGNOSIS — H52.221: ICD-10-CM

## 2025-06-24 PROCEDURE — FEMTO PRECISION LASER CATARACT SURGERY: Mod: GY | Performed by: OPHTHALMOLOGY

## 2025-06-24 PROCEDURE — 66984 XCAPSL CTRC RMVL W/O ECP: CPT | Mod: RT | Performed by: OPHTHALMOLOGY

## 2025-06-25 ENCOUNTER — RX ONLY (RX ONLY)
Age: 75
End: 2025-06-25

## 2025-06-25 ENCOUNTER — OFFICE (OUTPATIENT)
Dept: URBAN - METROPOLITAN AREA CLINIC 109 | Facility: CLINIC | Age: 75
Setting detail: OPHTHALMOLOGY
End: 2025-06-25
Payer: COMMERCIAL

## 2025-06-25 DIAGNOSIS — Z96.1: ICD-10-CM

## 2025-06-25 PROBLEM — H25.12 CATARACT SENILE NUCLEAR SCLEROSIS;  ,  , LEFT EYE: Status: ACTIVE | Noted: 2025-06-25

## 2025-06-25 PROCEDURE — 99024 POSTOP FOLLOW-UP VISIT: CPT | Performed by: OPTOMETRIST

## 2025-06-25 ASSESSMENT — REFRACTION_MANIFEST
OD_VA1: 20/NI
OD_SPHERE: +0.75
OS_SPHERE: +0.50

## 2025-06-25 ASSESSMENT — REFRACTION_CURRENTRX
OD_SPHERE: +2.00
OS_SPHERE: +2.00
OS_OVR_VA: 20/
OD_OVR_VA: 20/

## 2025-06-25 ASSESSMENT — REFRACTION_AUTOREFRACTION
OD_SPHERE: -0.25
OD_AXIS: 5
OD_CYLINDER: +0.50
OS_SPHERE: +0.25
OS_CYLINDER: -0.75
OS_AXIS: 101

## 2025-06-25 ASSESSMENT — KERATOMETRY
OS_AXISANGLE_DEGREES: 155
OD_K2POWER_DIOPTERS: 45.50
OD_AXISANGLE_DEGREES: 71
OS_K2POWER_DIOPTERS: 45.00
OD_K1POWER_DIOPTERS: 44.50
OS_K1POWER_DIOPTERS: 44.75

## 2025-06-25 ASSESSMENT — TONOMETRY: OS_IOP_MMHG: 12

## 2025-06-25 ASSESSMENT — CONFRONTATIONAL VISUAL FIELD TEST (CVF)
OD_FINDINGS: FULL
OS_FINDINGS: FULL

## 2025-06-25 ASSESSMENT — VISUAL ACUITY
OS_BCVA: 20/20
OD_BCVA: 20/

## 2025-07-15 ENCOUNTER — OFFICE (OUTPATIENT)
Dept: URBAN - METROPOLITAN AREA CLINIC 109 | Facility: CLINIC | Age: 75
Setting detail: OPHTHALMOLOGY
End: 2025-07-15
Payer: COMMERCIAL

## 2025-07-15 DIAGNOSIS — Z96.1: ICD-10-CM

## 2025-07-15 PROCEDURE — 99024 POSTOP FOLLOW-UP VISIT: CPT | Performed by: OPTOMETRIST

## 2025-07-15 ASSESSMENT — VISUAL ACUITY
OS_BCVA: 20/20-1
OD_BCVA: 20/25

## 2025-07-15 ASSESSMENT — KERATOMETRY
OS_K2POWER_DIOPTERS: 45.00
OD_K2POWER_DIOPTERS: 45.00
OS_AXISANGLE_DEGREES: 098
OD_K1POWER_DIOPTERS: 44.75
OS_K1POWER_DIOPTERS: 44.75
OD_AXISANGLE_DEGREES: 097

## 2025-07-15 ASSESSMENT — REFRACTION_AUTOREFRACTION
OS_CYLINDER: -0.75
OD_AXIS: 0
OD_SPHERE: -0.50
OD_CYLINDER: 0.00
OS_SPHERE: +0.25
OS_AXIS: 116

## 2025-07-15 ASSESSMENT — REFRACTION_CURRENTRX
OD_SPHERE: +2.00
OS_OVR_VA: 20/
OD_OVR_VA: 20/
OS_SPHERE: +2.00

## 2025-07-15 ASSESSMENT — REFRACTION_MANIFEST
OD_VA1: 20/NI
OD_SPHERE: +0.75
OS_SPHERE: +0.50

## 2025-07-15 ASSESSMENT — CONFRONTATIONAL VISUAL FIELD TEST (CVF)
OD_FINDINGS: FULL
OS_FINDINGS: FULL

## 2025-07-15 ASSESSMENT — TONOMETRY
OS_IOP_MMHG: 11
OD_IOP_MMHG: 12